# Patient Record
Sex: FEMALE | Race: WHITE | NOT HISPANIC OR LATINO | Employment: OTHER | URBAN - METROPOLITAN AREA
[De-identification: names, ages, dates, MRNs, and addresses within clinical notes are randomized per-mention and may not be internally consistent; named-entity substitution may affect disease eponyms.]

---

## 2024-04-14 ENCOUNTER — APPOINTMENT (OUTPATIENT)
Dept: GENERAL RADIOLOGY | Facility: CLINIC | Age: 74
End: 2024-04-14
Attending: EMERGENCY MEDICINE
Payer: COMMERCIAL

## 2024-04-14 ENCOUNTER — HOSPITAL ENCOUNTER (EMERGENCY)
Facility: CLINIC | Age: 74
Discharge: HOME | End: 2024-04-14
Attending: EMERGENCY MEDICINE | Admitting: EMERGENCY MEDICINE
Payer: COMMERCIAL

## 2024-04-14 VITALS
SYSTOLIC BLOOD PRESSURE: 155 MMHG | TEMPERATURE: 99.4 F | RESPIRATION RATE: 14 BRPM | DIASTOLIC BLOOD PRESSURE: 80 MMHG | OXYGEN SATURATION: 98 % | HEART RATE: 67 BPM

## 2024-04-14 DIAGNOSIS — R07.89 CHEST WALL PAIN: ICD-10-CM

## 2024-04-14 LAB
ALBUMIN SERPL BCG-MCNC: 4 G/DL (ref 3.5–5.2)
ALP SERPL-CCNC: 89 U/L (ref 40–150)
ALT SERPL W P-5'-P-CCNC: 6 U/L (ref 0–50)
ANION GAP SERPL CALCULATED.3IONS-SCNC: 12 MMOL/L (ref 7–15)
AST SERPL W P-5'-P-CCNC: 15 U/L (ref 0–45)
ATRIAL RATE - MUSE: 65 BPM
BASOPHILS # BLD AUTO: 0.1 10E3/UL (ref 0–0.2)
BASOPHILS NFR BLD AUTO: 1 %
BILIRUB SERPL-MCNC: 0.3 MG/DL
BUN SERPL-MCNC: 15.9 MG/DL (ref 8–23)
CALCIUM SERPL-MCNC: 9.5 MG/DL (ref 8.8–10.2)
CHLORIDE SERPL-SCNC: 107 MMOL/L (ref 98–107)
CREAT SERPL-MCNC: 0.89 MG/DL (ref 0.51–0.95)
DEPRECATED HCO3 PLAS-SCNC: 24 MMOL/L (ref 22–29)
DIASTOLIC BLOOD PRESSURE - MUSE: NORMAL MMHG
EGFRCR SERPLBLD CKD-EPI 2021: 68 ML/MIN/1.73M2
EOSINOPHIL # BLD AUTO: 0.1 10E3/UL (ref 0–0.7)
EOSINOPHIL NFR BLD AUTO: 1 %
ERYTHROCYTE [DISTWIDTH] IN BLOOD BY AUTOMATED COUNT: 14.4 % (ref 10–15)
GLUCOSE SERPL-MCNC: 79 MG/DL (ref 70–99)
HCT VFR BLD AUTO: 43 % (ref 35–47)
HGB BLD-MCNC: 13.7 G/DL (ref 11.7–15.7)
HOLD SPECIMEN: NORMAL
HOLD SPECIMEN: NORMAL
IMM GRANULOCYTES # BLD: 0 10E3/UL
IMM GRANULOCYTES NFR BLD: 0 %
INTERPRETATION ECG - MUSE: NORMAL
LYMPHOCYTES # BLD AUTO: 1.5 10E3/UL (ref 0.8–5.3)
LYMPHOCYTES NFR BLD AUTO: 26 %
MCH RBC QN AUTO: 26.2 PG (ref 26.5–33)
MCHC RBC AUTO-ENTMCNC: 31.9 G/DL (ref 31.5–36.5)
MCV RBC AUTO: 82 FL (ref 78–100)
MONOCYTES # BLD AUTO: 0.6 10E3/UL (ref 0–1.3)
MONOCYTES NFR BLD AUTO: 11 %
NEUTROPHILS # BLD AUTO: 3.4 10E3/UL (ref 1.6–8.3)
NEUTROPHILS NFR BLD AUTO: 61 %
NRBC # BLD AUTO: 0 10E3/UL
NRBC BLD AUTO-RTO: 0 /100
P AXIS - MUSE: 54 DEGREES
PLATELET # BLD AUTO: 223 10E3/UL (ref 150–450)
POTASSIUM SERPL-SCNC: 4.2 MMOL/L (ref 3.4–5.3)
PR INTERVAL - MUSE: 140 MS
PROT SERPL-MCNC: 7.3 G/DL (ref 6.4–8.3)
QRS DURATION - MUSE: 84 MS
QT - MUSE: 376 MS
QTC - MUSE: 391 MS
R AXIS - MUSE: 60 DEGREES
RBC # BLD AUTO: 5.22 10E6/UL (ref 3.8–5.2)
SODIUM SERPL-SCNC: 143 MMOL/L (ref 135–145)
SYSTOLIC BLOOD PRESSURE - MUSE: NORMAL MMHG
T AXIS - MUSE: 63 DEGREES
TROPONIN T SERPL HS-MCNC: 10 NG/L
TROPONIN T SERPL HS-MCNC: 14 NG/L
VENTRICULAR RATE- MUSE: 65 BPM
WBC # BLD AUTO: 5.6 10E3/UL (ref 4–11)

## 2024-04-14 PROCEDURE — 250N000013 HC RX MED GY IP 250 OP 250 PS 637: Performed by: EMERGENCY MEDICINE

## 2024-04-14 PROCEDURE — 96374 THER/PROPH/DIAG INJ IV PUSH: CPT

## 2024-04-14 PROCEDURE — 93005 ELECTROCARDIOGRAM TRACING: CPT

## 2024-04-14 PROCEDURE — 80053 COMPREHEN METABOLIC PANEL: CPT | Performed by: EMERGENCY MEDICINE

## 2024-04-14 PROCEDURE — 36415 COLL VENOUS BLD VENIPUNCTURE: CPT | Performed by: EMERGENCY MEDICINE

## 2024-04-14 PROCEDURE — 250N000011 HC RX IP 250 OP 636: Performed by: EMERGENCY MEDICINE

## 2024-04-14 PROCEDURE — 85041 AUTOMATED RBC COUNT: CPT | Performed by: EMERGENCY MEDICINE

## 2024-04-14 PROCEDURE — 71046 X-RAY EXAM CHEST 2 VIEWS: CPT

## 2024-04-14 PROCEDURE — 84484 ASSAY OF TROPONIN QUANT: CPT | Mod: 91 | Performed by: EMERGENCY MEDICINE

## 2024-04-14 PROCEDURE — 99285 EMERGENCY DEPT VISIT HI MDM: CPT | Mod: 25

## 2024-04-14 RX ORDER — LORAZEPAM 0.5 MG/1
1 TABLET ORAL ONCE
Status: COMPLETED | OUTPATIENT
Start: 2024-04-14 | End: 2024-04-14

## 2024-04-14 RX ORDER — LORAZEPAM 0.5 MG/1
0.5 TABLET ORAL EVERY 6 HOURS PRN
Qty: 10 TABLET | Refills: 0 | Status: SHIPPED | OUTPATIENT
Start: 2024-04-14

## 2024-04-14 RX ORDER — LORAZEPAM 0.5 MG/1
0.5 TABLET ORAL EVERY 6 HOURS PRN
Qty: 10 TABLET | Refills: 0 | Status: SHIPPED | OUTPATIENT
Start: 2024-04-14 | End: 2024-04-14

## 2024-04-14 RX ORDER — NITROGLYCERIN 0.4 MG/1
0.4 TABLET SUBLINGUAL EVERY 5 MIN PRN
Status: DISCONTINUED | OUTPATIENT
Start: 2024-04-14 | End: 2024-04-14 | Stop reason: HOSPADM

## 2024-04-14 RX ORDER — MORPHINE SULFATE 4 MG/ML
4 INJECTION, SOLUTION INTRAMUSCULAR; INTRAVENOUS
Status: DISCONTINUED | OUTPATIENT
Start: 2024-04-14 | End: 2024-04-14 | Stop reason: HOSPADM

## 2024-04-14 RX ADMIN — NITROGLYCERIN 0.4 MG: 0.4 TABLET SUBLINGUAL at 17:25

## 2024-04-14 RX ADMIN — LORAZEPAM 1 MG: 0.5 TABLET ORAL at 17:30

## 2024-04-14 RX ADMIN — NITROGLYCERIN 0.4 MG: 0.4 TABLET SUBLINGUAL at 16:27

## 2024-04-14 RX ADMIN — MORPHINE SULFATE 4 MG: 4 INJECTION, SOLUTION INTRAMUSCULAR; INTRAVENOUS at 18:56

## 2024-04-14 ASSESSMENT — ACTIVITIES OF DAILY LIVING (ADL)
ADLS_ACUITY_SCORE: 35

## 2024-04-14 ASSESSMENT — COLUMBIA-SUICIDE SEVERITY RATING SCALE - C-SSRS
2. HAVE YOU ACTUALLY HAD ANY THOUGHTS OF KILLING YOURSELF IN THE PAST MONTH?: NO
1. IN THE PAST MONTH, HAVE YOU WISHED YOU WERE DEAD OR WISHED YOU COULD GO TO SLEEP AND NOT WAKE UP?: NO
6. HAVE YOU EVER DONE ANYTHING, STARTED TO DO ANYTHING, OR PREPARED TO DO ANYTHING TO END YOUR LIFE?: NO

## 2024-04-14 NOTE — ED PROVIDER NOTES
History     Chief Complaint:  Chest Pain       HPI   Ambreen Beverly is a 73 year old female, with a history of myocardial infarctions, presenting to the emergency department for evaluation of chest pain. The patient reports an onset of chest pain last night while she was watching TV. She reports taking her nitroglycerin which did not provide relief. She notes her chest pain returned this morning and notes sharp pains in the left side of her chest. She has had 3 prior myocardial infarctions, but denies that her symptoms today are similar to her symptoms then. She also reports having 8 stents placed in her chest. She notes pain in her left arm. She reports taking her medications regularly and denies any new changes to her medications. She denies any new stress or anxiety, she denies anticoagulation, or recent illness.        Independent Historian:   None - Patient Only    Review of External Notes:   Chart review, [place] prescription monitoring       Medications:    Aspirin 81mg   Duloxetine   Lunesta  Zetia   Lisinopril-hydrochlorothiazide   Ativan   Nitroquick   Crestor   Detrol       Past Medical History:    AC joint arthritis- left   Benign heart murmur   CAD   CTS-   Dependent edemia   Depression  Fibromyalgia   Anxiety   Hypertension   Hyperlipidemia   NSTEMI   PTSD  Shoulder impingement   Stasis dermatitis   Suicide attempt   Tendinitis- left forearm   Venous stasis       Past Surgical History:    Angiogram   Angioplasty  Arthroscopy knee bilaterial  Bladder surgery   Cholecystectomy, laporoscopic   CL AFF surgical pathology  History of breast reduction  Hysterectomy,   Tubal ligation.     Physical Exam   No data found.       Physical Exam    GENERAL: well developed, pleasant, appears uncomfortable.   HEAD: atraumatic  EYES: pupils reactive, extraocular muscles intact, conjunctivae normal  ENT:  mucus membranes moist  NECK:  trachea midline, normal range of motion  RESPIRATORY: no tachypnea, breath sounds clear  to auscultation   CVS: normal S1/S2, no murmurs, intact distal pulses. Pitting edema bilaterally with chronic venous stasis changes in lower extremities.    ABDOMEN: soft, nontender, nondistention  MUSCULOSKELETAL: no deformities  SKIN: warm and dry, no acute rashes or ulceration  NEURO: GCS 15, cranial nerves intact, alert and oriented x3  PSYCH:  Mood/affect normal      Emergency Department Course   ECG  ECG results from 04/14/24   EKG 12-lead, tracing only     Value    Systolic Blood Pressure     Diastolic Blood Pressure     Ventricular Rate 65    Atrial Rate 65    WI Interval 140    QRS Duration 84        QTc 391    P Axis 54    R AXIS 60    T Axis 63    Interpretation ECG      Sinus rhythm  Normal ECG  When compared with ECG of 14-MAY-2007 12:15,  No significant change was found  Confirmed by GENERATED REPORT, COMPUTER ([id number]),  Shelby Lr ([id number]) on 4/14/2024 9:00:07 PM           Imaging:  XR Chest 2 Views   Final Result   IMPRESSION: No acute cardiopulmonary abnormality. Degenerative changes in the spine.             Laboratory:  Labs Ordered and Resulted from Time of ED Arrival to Time of ED Departure   CBC WITH PLATELETS AND DIFFERENTIAL - Abnormal       Result Value    WBC Count 5.6      RBC Count 5.22 (*)     Hemoglobin 13.7      Hematocrit 43.0      MCV 82      MCH 26.2 (*)     MCHC 31.9      RDW 14.4      Platelet Count 223      % Neutrophils 61      % Lymphocytes 26      % Monocytes 11      % Eosinophils 1      % Basophils 1      % Immature Granulocytes 0      NRBCs per 100 WBC 0      Absolute Neutrophils 3.4      Absolute Lymphocytes 1.5      Absolute Monocytes 0.6      Absolute Eosinophils 0.1      Absolute Basophils 0.1      Absolute Immature Granulocytes 0.0      Absolute NRBCs 0.0     COMPREHENSIVE METABOLIC PANEL - Normal    Sodium 143      Potassium 4.2      Carbon Dioxide (CO2) 24      Anion Gap 12      Urea Nitrogen 15.9      Creatinine 0.89      GFR Estimate 68      Calcium 9.5       Chloride 107      Glucose 79      Alkaline Phosphatase 89      AST 15      ALT 6      Protein Total 7.3      Albumin 4.0      Bilirubin Total 0.3     TROPONIN T, HIGH SENSITIVITY - Normal    Troponin T, High Sensitivity 10     TROPONIN T, HIGH SENSITIVITY - Normal    Troponin T, High Sensitivity 14          Procedures   na    Emergency Department Course & Assessments:    Interventions:  Medications   LORazepam (ATIVAN) tablet 1 mg (1 mg Oral $Given 4/14/24 1730)        Assessments:  1620       I obtained history and examined the patient as noted above.   1730       I reevaluated the patient.      Independent Interpretation (X-rays, CTs, rhythm strip):  None    Consultations/Discussion of Management or Tests:  None        Social Determinants of Health affecting care:   None    Disposition:  The patient was discharged.     Impression & Plan    CMS Diagnoses: None         Medical Decision Making:  Patient presents with chest pain that is reproducible.  Considered STEMI, nonstemi, chest wall pain, anxiety, GERD, amongst others.  She has taken nitroglycerin without change.  Serial troponins are normal.  She was given ativan and feels improved.  She notes she has chronic pain and would like a script for ativan which she has had in the past.  Discussed with her precautions and risks of narcotics (tramadol) and ativan and addiction, fall risk, etc.  She had been on 1 mg in the past, will try a short course of 0.5.  She can follow with primary MD and return if worse.      Diagnosis:    ICD-10-CM    1. Chest wall pain  R07.89            Discharge Medications:  Discharge Medication List as of 4/14/2024  8:04 PM             Scribe Disclosure:  HARRIS, Adriano Haro, am serving as a scribe at 4:18 PM on 4/14/2024 to document services personally performed by Jose Teixeira MD based on my observations and the provider's statements to me.   4/14/2024   Jose Teixeira MD Adams, Shaun L, MD  04/17/24 [id number]

## 2024-04-14 NOTE — ED TRIAGE NOTES
Pt has been having chest pain intermittently for the past few days, today was driving around and went to get her bp checked and it was in the 200's systolic, bg 67, 1 nitroglycerin and 324mg aspirin given by ems

## 2024-04-15 NOTE — ED NOTES
Patient discharged with instructions. Reviewed safety precautions when taking medications. Ambulated independently to lobby to meet friend who is picking her up.

## 2025-04-26 ENCOUNTER — OFFICE VISIT (OUTPATIENT)
Dept: URGENT CARE | Facility: URGENT CARE | Age: 75
End: 2025-04-26
Payer: COMMERCIAL

## 2025-04-26 VITALS
HEART RATE: 79 BPM | OXYGEN SATURATION: 98 % | RESPIRATION RATE: 16 BRPM | DIASTOLIC BLOOD PRESSURE: 90 MMHG | SYSTOLIC BLOOD PRESSURE: 138 MMHG | WEIGHT: 226 LBS | TEMPERATURE: 96.8 F

## 2025-04-26 DIAGNOSIS — R32 URINARY INCONTINENCE, UNSPECIFIED TYPE: Primary | ICD-10-CM

## 2025-04-26 PROCEDURE — 99203 OFFICE O/P NEW LOW 30 MIN: CPT | Performed by: PHYSICIAN ASSISTANT

## 2025-04-26 PROCEDURE — 3080F DIAST BP >= 90 MM HG: CPT | Performed by: PHYSICIAN ASSISTANT

## 2025-04-26 PROCEDURE — 3075F SYST BP GE 130 - 139MM HG: CPT | Performed by: PHYSICIAN ASSISTANT

## 2025-04-26 RX ORDER — LISINOPRIL 30 MG/1
30 TABLET ORAL
COMMUNITY
Start: 2024-09-24

## 2025-04-26 RX ORDER — ATORVASTATIN CALCIUM 80 MG/1
1 TABLET, FILM COATED ORAL AT BEDTIME
COMMUNITY
Start: 2023-08-25

## 2025-04-26 RX ORDER — VENLAFAXINE 50 MG/1
50 TABLET ORAL EVERY MORNING
COMMUNITY

## 2025-04-26 RX ORDER — TRAMADOL HYDROCHLORIDE 50 MG/1
50 TABLET ORAL 3 TIMES DAILY PRN
COMMUNITY

## 2025-04-26 RX ORDER — LEVOTHYROXINE SODIUM 88 UG/1
88 TABLET ORAL
COMMUNITY

## 2025-04-26 RX ORDER — ISOSORBIDE MONONITRATE 30 MG/1
30 TABLET, EXTENDED RELEASE ORAL
COMMUNITY
Start: 2024-03-05
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
## 2025-04-26 NOTE — PATIENT INSTRUCTIONS
Patient was educated on the natural course of condition.  She wants to try new medication but I have deferred that decision to her IM provider. She has an appointment in 2 weeks. Conservative measures include drinking fluids (water).  Seek emergency care if you develop severe abdominal/flank pain, or fever.

## 2025-04-26 NOTE — PROGRESS NOTES
Urgent Care Clinic Visit    Chief Complaint   Patient presents with    Urgent Care     Wants to talk about her medications she is taking for her incontinence-can't get in to see provider for over 2 weeks                4/26/2025    10:02 AM   Additional Questions   Roomed by Ky   Accompanied by Self